# Patient Record
(demographics unavailable — no encounter records)

---

## 2020-05-22 NOTE — REP
Clinical:  Pleurodynia and rib pain

 

Technique:  Frontal view of the chest with 5 views of the left hemithorax.

 

Findings:

Frontal view of the chest demonstrates no acute cardiopulmonary process.

Multiple views of the left hemithorax demonstrates no obvious acute rib fracture

or pathology.

 

Impression:

Normal left rib series

 

 

Electronically Signed by

Paul Bull MD 05/22/2020 12:45 A